# Patient Record
Sex: MALE | Race: WHITE | NOT HISPANIC OR LATINO | Employment: OTHER
[De-identification: names, ages, dates, MRNs, and addresses within clinical notes are randomized per-mention and may not be internally consistent; named-entity substitution may affect disease eponyms.]

---

## 2021-01-14 DIAGNOSIS — Z23 NEED FOR VACCINATION: ICD-10-CM

## 2021-10-14 ENCOUNTER — OFFICE VISIT (OUTPATIENT)
Dept: MEDICAL GROUP | Facility: OTHER | Age: 81
End: 2021-10-14
Payer: MEDICARE

## 2021-10-14 VITALS
RESPIRATION RATE: 18 BRPM | DIASTOLIC BLOOD PRESSURE: 70 MMHG | TEMPERATURE: 98.3 F | HEIGHT: 70 IN | OXYGEN SATURATION: 95 % | HEART RATE: 71 BPM | WEIGHT: 200 LBS | SYSTOLIC BLOOD PRESSURE: 130 MMHG | BODY MASS INDEX: 28.63 KG/M2

## 2021-10-14 DIAGNOSIS — I10 PRIMARY HYPERTENSION: ICD-10-CM

## 2021-10-14 DIAGNOSIS — R25.2 MUSCLE CRAMPS: ICD-10-CM

## 2021-10-14 DIAGNOSIS — E11.9 TYPE 2 DIABETES MELLITUS WITHOUT COMPLICATION, WITHOUT LONG-TERM CURRENT USE OF INSULIN (HCC): ICD-10-CM

## 2021-10-14 DIAGNOSIS — E03.8 OTHER SPECIFIED HYPOTHYROIDISM: ICD-10-CM

## 2021-10-14 DIAGNOSIS — C44.1192 BASAL CELL CARCINOMA (BCC) OF LEFT LOWER EYELID: ICD-10-CM

## 2021-10-14 DIAGNOSIS — I48.0 PAROXYSMAL ATRIAL FIBRILLATION (HCC): ICD-10-CM

## 2021-10-14 DIAGNOSIS — N18.4 CHRONIC RENAL IMPAIRMENT, STAGE 4 (SEVERE) (HCC): ICD-10-CM

## 2021-10-14 DIAGNOSIS — E78.2 MIXED HYPERLIPIDEMIA: ICD-10-CM

## 2021-10-14 PROBLEM — R79.89 DECREASED TESTOSTERONE LEVEL: Status: ACTIVE | Noted: 2020-10-14

## 2021-10-14 PROBLEM — Z00.00 ENCOUNTER FOR GENERAL ADULT MEDICAL EXAMINATION WITHOUT ABNORMAL FINDINGS: Status: ACTIVE | Noted: 2020-04-14

## 2021-10-14 PROBLEM — G47.33 OBSTRUCTIVE SLEEP APNEA, ADULT: Status: ACTIVE | Noted: 2020-01-10

## 2021-10-14 PROBLEM — E78.5 HYPERLIPIDEMIA: Status: ACTIVE | Noted: 2020-01-10

## 2021-10-14 PROBLEM — N18.9 CHRONIC RENAL INSUFFICIENCY: Status: ACTIVE | Noted: 2020-01-10

## 2021-10-14 LAB
HBA1C MFR BLD: 6.4 % (ref 0–5.6)
INT CON NEG: ABNORMAL
INT CON POS: ABNORMAL

## 2021-10-14 PROCEDURE — 83036 HEMOGLOBIN GLYCOSYLATED A1C: CPT | Performed by: FAMILY MEDICINE

## 2021-10-14 PROCEDURE — 99214 OFFICE O/P EST MOD 30 MIN: CPT | Performed by: FAMILY MEDICINE

## 2021-10-14 RX ORDER — PIOGLITAZONEHYDROCHLORIDE 15 MG/1
30 TABLET ORAL DAILY
COMMUNITY
End: 2022-01-14 | Stop reason: SDUPTHER

## 2021-10-14 RX ORDER — METOPROLOL TARTRATE 1 MG/ML
10 INJECTION, SOLUTION INTRAVENOUS
COMMUNITY
End: 2022-07-11

## 2021-10-14 ASSESSMENT — ENCOUNTER SYMPTOMS
COUGH: 0
FEVER: 0

## 2021-10-14 NOTE — PROGRESS NOTES
Subjective:   Maurisio Jones is a 81 y.o. male here for the evaluation and management of Follow-Up (f/u every 3 months)    Basal cell carcinoma as diagnosed by dermatology in the medial aspect of the left orbit and on the posterior aspect of his shoulder-he has been unable to obtain definitive excision by dermatology, he is allergic to lidocaine and apparently they do not have an alternative.  He reports recent dental procedure where an alternative local anesthetic was used without complication.  We discussed treatment options including plastic surgery evaluation  Problem   Muscle Cramps    Stable with electrolyte supplementation with noted improvement     Decreased Testosterone Level   Encounter for General Adult Medical Examination Without Abnormal Findings   Chronic Renal Insufficiency    Ongoing management by nephrology     Hyperlipidemia   Hypertension    Stable     Paroxysmal Atrial Fibrillation (Hcc)    Stable with current medications and reports no cardiac symptoms     Other Specified Hypothyroidism    Stable     Obstructive Sleep Apnea, Adult   Type 2 Diabetes Mellitus Without Complications (Hcc)    Stable with current medications     Urinary Frequency (Resolved)   Elevated Psa (Resolved)    no biopsies per pt         Review of Systems   Constitutional: Negative for fever.   Respiratory: Negative for cough.        Current Outpatient Medications   Medication Sig Dispense Refill   • SITagliptin (JANUVIA) 100 MG Tab Take 100 mg by mouth every day.     • pioglitazone (ACTOS) 15 MG Tab Take 30 mg by mouth every day.     • Metoprolol Tartrate (LOPRESSOR) 5 MG/5ML Solution Infuse 10 mg into a venous catheter. 1/2 pill po     • ONE TOUCH ULTRA TEST strip TEST THREE TIMES A DAY AS DIRECTED 100 Strip 3   • ARMOUR THYROID PO Take  by mouth. 3 1/2 grains      • anastrozole (ARIMIDEX) 1 MG TABS Take 0.5 mg by mouth Every Sunday and Wednesday.       No current facility-administered medications for this visit.  "    Allergies  Patient has no known allergies.    Past Medical History:   Diagnosis Date   • AAA (abdominal aortic aneurysm) (HCC)    • ADRENAL GLAND DYSFUNCTION    • Elevated PSA     no biopsies per pt   • Hyperlipidemia    • Hypogonadism     saw Dr. Bolton naturopath     Patient Active Problem List    Diagnosis Date Noted   • Muscle cramps 06/23/2021   • Decreased testosterone level 10/14/2020   • Encounter for general adult medical examination without abnormal findings 04/14/2020   • Chronic renal insufficiency 01/10/2020   • Hyperlipidemia 01/10/2020   • Hypertension 01/10/2020   • Paroxysmal atrial fibrillation (HCC) 01/10/2020   • Other specified hypothyroidism 01/10/2020   • Obstructive sleep apnea, adult 01/10/2020   • Type 2 diabetes mellitus without complications (HCC) 01/10/2020   • BPH (benign prostatic hypertrophy) 01/17/2012   • ED (erectile dysfunction) 01/17/2012   • ADRENAL GLAND DYSFUNCTION    • Hypogonadism    • ADRENAL GLAND DYSFUNCTION    • AAA (abdominal aortic aneurysm) (HCC)        Past Surgical History  History reviewed. No pertinent surgical history.     Objective:     Vitals:    10/14/21 1352   BP: 130/70   BP Location: Left arm   Patient Position: Sitting   BP Cuff Size: Adult   Pulse: 71   Resp: 18   Temp: 36.8 °C (98.3 °F)   TempSrc: Temporal   SpO2: 95%   Weight: 90.7 kg (200 lb)   Height: 1.778 m (5' 10\")     Body mass index is 28.7 kg/m².     Physical Exam  Constitutional:       Appearance: Normal appearance.   HENT:      Head: Normocephalic and atraumatic.   Eyes:      Extraocular Movements: Extraocular movements intact.      Conjunctiva/sclera: Conjunctivae normal.   Cardiovascular:      Rate and Rhythm: Normal rate. Rhythm irregular.      Heart sounds: Normal heart sounds.   Pulmonary:      Effort: Pulmonary effort is normal.      Breath sounds: Normal breath sounds.   Skin:     General: Skin is warm and dry.   Neurological:      General: No focal deficit present.      " Mental Status: He is alert and oriented to person, place, and time. Mental status is at baseline.   Psychiatric:         Mood and Affect: Mood normal.         Behavior: Behavior normal.     Skin exam over the medial left orbit demonstrates a small papular lesion approximately 2 mm in size correlating with recent skin biopsy demonstrating basal cell carcinoma    Assessment and Plan:   Maurisio Jones is a 81 y.o. male with a Follow-Up (f/u every 3 months)     The following was discussed with the patient today.    Problem List Items Addressed This Visit     Chronic renal insufficiency    Hyperlipidemia    Relevant Medications    Metoprolol Tartrate (LOPRESSOR) 5 MG/5ML Solution    Hypertension    Relevant Medications    Metoprolol Tartrate (LOPRESSOR) 5 MG/5ML Solution    Paroxysmal atrial fibrillation (HCC)    Relevant Medications    Metoprolol Tartrate (LOPRESSOR) 5 MG/5ML Solution    Other specified hypothyroidism    Muscle cramps    Type 2 diabetes mellitus without complications (HCC)    Relevant Medications    SITagliptin (JANUVIA) 100 MG Tab    pioglitazone (ACTOS) 15 MG Tab    Other Relevant Orders    POCT  A1C (Completed)    Comp Metabolic Panel    HEMOGLOBIN A1C    Lipid Profile      Other Visit Diagnoses     Basal cell carcinoma (BCC) of left lower eyelid        Relevant Orders    REFERRAL TO PLASTIC SURGERY        Laboratory studies reviewed and discussed, point-of-care hemoglobin A1c was 6.4, medications reviewed and updated with refills provided, referral to plastics provided given the inability to accomplish definitive treatment of basal cell carcinoma secondary to his adverse effect of lidocaine and dermatology's inability to find an acceptable alternative.  Close follow-up in 3 months with repeat labs.    Followup: Return in about 3 months (around 1/14/2022).    Ochoa Parr M.D.

## 2021-11-18 RX ORDER — BENZONATATE 100 MG/1
100 CAPSULE ORAL 3 TIMES DAILY PRN
COMMUNITY
End: 2021-11-18 | Stop reason: SDUPTHER

## 2021-11-18 RX ORDER — BENZONATATE 100 MG/1
100 CAPSULE ORAL 3 TIMES DAILY PRN
Qty: 60 CAPSULE | Refills: 2 | Status: SHIPPED
Start: 2021-11-18 | End: 2022-01-13

## 2022-01-13 ENCOUNTER — OFFICE VISIT (OUTPATIENT)
Dept: MEDICAL GROUP | Facility: OTHER | Age: 82
End: 2022-01-13
Payer: MEDICARE

## 2022-01-13 VITALS
HEIGHT: 70 IN | BODY MASS INDEX: 29.35 KG/M2 | OXYGEN SATURATION: 94 % | HEART RATE: 97 BPM | SYSTOLIC BLOOD PRESSURE: 115 MMHG | RESPIRATION RATE: 16 BRPM | DIASTOLIC BLOOD PRESSURE: 70 MMHG | TEMPERATURE: 98.2 F | WEIGHT: 205 LBS

## 2022-01-13 DIAGNOSIS — N18.4 CHRONIC RENAL IMPAIRMENT, STAGE 4 (SEVERE) (HCC): ICD-10-CM

## 2022-01-13 DIAGNOSIS — I10 PRIMARY HYPERTENSION: ICD-10-CM

## 2022-01-13 DIAGNOSIS — E11.9 TYPE 2 DIABETES MELLITUS WITHOUT COMPLICATION, WITHOUT LONG-TERM CURRENT USE OF INSULIN (HCC): ICD-10-CM

## 2022-01-13 DIAGNOSIS — E03.8 OTHER SPECIFIED HYPOTHYROIDISM: ICD-10-CM

## 2022-01-13 DIAGNOSIS — T16.2XXA FOREIGN BODY OF LEFT EAR, INITIAL ENCOUNTER: ICD-10-CM

## 2022-01-13 DIAGNOSIS — E78.2 MIXED HYPERLIPIDEMIA: ICD-10-CM

## 2022-01-13 DIAGNOSIS — I48.0 PAROXYSMAL ATRIAL FIBRILLATION (HCC): ICD-10-CM

## 2022-01-13 LAB
HBA1C MFR BLD: 6.1 % (ref 0–5.6)
INT CON NEG: ABNORMAL
INT CON POS: ABNORMAL

## 2022-01-13 PROCEDURE — 83036 HEMOGLOBIN GLYCOSYLATED A1C: CPT | Performed by: FAMILY MEDICINE

## 2022-01-13 PROCEDURE — 99214 OFFICE O/P EST MOD 30 MIN: CPT | Mod: 25 | Performed by: FAMILY MEDICINE

## 2022-01-13 RX ORDER — LINAGLIPTIN 5 MG/1
5 TABLET, FILM COATED ORAL DAILY
Qty: 90 TABLET | Refills: 1 | Status: SHIPPED | OUTPATIENT
Start: 2022-01-13

## 2022-01-13 RX ORDER — PIOGLITAZONEHYDROCHLORIDE 30 MG/1
TABLET ORAL
COMMUNITY
Start: 2021-11-03 | End: 2022-01-13

## 2022-01-13 RX ORDER — LISINOPRIL 10 MG/1
TABLET ORAL
COMMUNITY
Start: 2021-11-03 | End: 2022-01-14 | Stop reason: SDUPTHER

## 2022-01-13 RX ORDER — FLUTICASONE PROPIONATE 50 MCG
SPRAY, SUSPENSION (ML) NASAL
COMMUNITY
End: 2022-01-13

## 2022-01-13 RX ORDER — VISMODEGIB 150 MG/1
CAPSULE ORAL
COMMUNITY
Start: 2022-01-05

## 2022-01-13 RX ORDER — LEVOTHYROXINE SODIUM 137 UG/1
TABLET ORAL
COMMUNITY
Start: 2021-11-14 | End: 2022-02-23 | Stop reason: SDUPTHER

## 2022-01-13 RX ORDER — TESTOSTERONE CYPIONATE 200 MG/ML
INJECTION, SOLUTION INTRAMUSCULAR
COMMUNITY
End: 2022-01-13

## 2022-01-13 RX ORDER — METOPROLOL TARTRATE AND HYDROCHLOROTHIAZIDE 50; 25 MG/1; MG/1
TABLET ORAL
COMMUNITY
End: 2022-01-13

## 2022-01-13 NOTE — PROGRESS NOTES
Subjective:   Maurisio Jones is a 81 y.o. male here for the evaluation and management of Follow-Up (3 month check )    Chronic renal insufficiency-reviewed and discussed recent laboratory studies with largely stable findings but elevated potassium level, we discussed laboratory studies with the patient and his wife and I contacted his nephrologist and left a detailed message.    Hyperlipidemia- stable    Hypertension-stable    Hypothyroidism-stable    Paroxysmal atrial fibrillation-stable and no complaints    Diabetes-stable, reviewed and discussed labs with good control of blood sugar    He reports part of his hearing aid got stuck in his left ear  Problem   Chronic Renal Insufficiency    Ongoing management by nephrology, Dr. Soliz         Review of Systems   Constitutional: Negative.    Cardiovascular: Negative.        Current Outpatient Medications   Medication Sig Dispense Refill   • ERIVEDGE 150 MG Cap      • lisinopril (PRINIVIL) 10 MG Tab      • levothyroxine (SYNTHROID) 137 MCG Tab      • glucose blood strip OneTouch Ultra Blue Test Strip     • SITagliptin (JANUVIA) 100 MG Tab Take 100 mg by mouth every day.     • pioglitazone (ACTOS) 15 MG Tab Take 30 mg by mouth every day.     • Metoprolol Tartrate (LOPRESSOR) 5 MG/5ML Solution Infuse 10 mg into a venous catheter. 1/2 pill po     • ONE TOUCH ULTRA TEST strip TEST THREE TIMES A DAY AS DIRECTED 100 Strip 3   • benzonatate (TESSALON) 100 MG Cap Take 1 Capsule by mouth 3 times a day as needed. 60 Capsule 2     No current facility-administered medications for this visit.     Allergies  Patient has no known allergies.    Past Medical History:   Diagnosis Date   • AAA (abdominal aortic aneurysm) (HCC)    • ADRENAL GLAND DYSFUNCTION    • Elevated PSA     no biopsies per pt   • Hyperlipidemia    • Hypogonadism     saw Dr. Bolton naturopath     Patient Active Problem List    Diagnosis Date Noted   • Muscle cramps 06/23/2021   • Decreased testosterone  "level 10/14/2020   • Encounter for general adult medical examination without abnormal findings 04/14/2020   • Chronic renal insufficiency 01/10/2020   • Hyperlipidemia 01/10/2020   • Hypertension 01/10/2020   • Paroxysmal atrial fibrillation (HCC) 01/10/2020   • Other specified hypothyroidism 01/10/2020   • Obstructive sleep apnea, adult 01/10/2020   • Type 2 diabetes mellitus without complications (HCC) 01/10/2020   • BPH (benign prostatic hypertrophy) 01/17/2012   • ED (erectile dysfunction) 01/17/2012   • ADRENAL GLAND DYSFUNCTION    • Hypogonadism    • ADRENAL GLAND DYSFUNCTION    • AAA (abdominal aortic aneurysm) (HCC)        Past Surgical History  History reviewed. No pertinent surgical history.     Objective:     Vitals:    01/13/22 1324   BP: 115/70   BP Location: Left arm   Patient Position: Sitting   BP Cuff Size: Adult   Pulse: 97   Resp: 16   Temp: 36.8 °C (98.2 °F)   TempSrc: Temporal   SpO2: 94%   Weight: 93 kg (205 lb)   Height: 1.778 m (5' 10\")     Body mass index is 29.41 kg/m².     Physical Exam  Constitutional:       Appearance: Normal appearance.   HENT:      Head: Normocephalic.   Eyes:      Extraocular Movements: Extraocular movements intact.      Conjunctiva/sclera: Conjunctivae normal.   Cardiovascular:      Rate and Rhythm: Normal rate and regular rhythm.      Heart sounds: Normal heart sounds.   Pulmonary:      Effort: Pulmonary effort is normal.      Breath sounds: Normal breath sounds.   Skin:     General: Skin is warm and dry.   Neurological:      Mental Status: He is alert and oriented to person, place, and time. Mental status is at baseline.   Psychiatric:         Mood and Affect: Mood normal.         Behavior: Behavior normal.     Left ear examination demonstrated  Demonstrated a silicone part of his hearing aid was lodged in his left external auditory canal, after removal the exam was benign  Assessment and Plan:   Maurisio Jones is a 81 y.o. male with a Follow-Up (3 month check " )     The following was discussed with the patient today.    Problem List Items Addressed This Visit     Chronic renal insufficiency    Hyperlipidemia    Relevant Medications    lisinopril (PRINIVIL) 10 MG Tab    Hypertension    Relevant Medications    lisinopril (PRINIVIL) 10 MG Tab    Other specified hypothyroidism    Relevant Medications    levothyroxine (SYNTHROID) 137 MCG Tab    Type 2 diabetes mellitus without complications (HCC)        Reviewed and refills provided, reviewed and discussed recent laboratory studies with subsequent phone call to nephrology and a detailed message left with the medical assistant regarding elevation in potassium level, removal of foreign body from his left ear, recommended close follow-up in 3 months and follow-up as planned with specialists  Followup: No follow-ups on file.    Ochoa Parr M.D.    Please note that this dictation was created using voice recognition software. I have made every reasonable attempt to correct obvious errors, but I expect that there are errors of grammar and possibly content that I did not discover before finalizing the note.

## 2022-01-14 RX ORDER — PIOGLITAZONEHYDROCHLORIDE 15 MG/1
15 TABLET ORAL DAILY
Qty: 90 TABLET | Refills: 3 | Status: SHIPPED
Start: 2022-01-14 | End: 2022-01-20

## 2022-01-14 RX ORDER — LISINOPRIL 10 MG/1
10 TABLET ORAL DAILY
Qty: 90 TABLET | Refills: 3 | Status: SHIPPED | OUTPATIENT
Start: 2022-01-14 | End: 2022-07-11 | Stop reason: SDUPTHER

## 2022-01-14 ASSESSMENT — ENCOUNTER SYMPTOMS
CARDIOVASCULAR NEGATIVE: 1
CONSTITUTIONAL NEGATIVE: 1

## 2022-01-14 NOTE — PROCEDURES
General Procedure    Date/Time: 1/14/2022 8:47 AM  Performed by: Ochoa Parr M.D.  Authorized by: Ochoa Parr M.D.   Consent: Verbal consent obtained.  Consent given by: patient  Patient identity confirmed: verbally with patient  Local anesthesia used: no    Anesthesia:  Local anesthesia used: no    Sedation:  Patient sedated: no    Comments: Maurisio reported part of his hearing aid became lodged in his left ear and requested removal.  On examination there was silicone part associated with his hearing aids that it become lodged deep in his left external auditory canal with associated cerumen.  Patient requested removal and I obtained forceps and a probe.  The probe was used to mobilize the silicone and subsequently I used forceps to grasp the silicone and remove it.  Removal was complicated by adherence to the external auditory canal.  Ultimately we were successful with patient's and repeated attempts.  There were no complications.        Foreign body removed from the left ear

## 2022-01-18 ENCOUNTER — TELEPHONE (OUTPATIENT)
Dept: MEDICAL GROUP | Facility: OTHER | Age: 82
End: 2022-01-18

## 2022-01-18 DIAGNOSIS — E11.9 TYPE 2 DIABETES MELLITUS WITHOUT COMPLICATION, WITHOUT LONG-TERM CURRENT USE OF INSULIN (HCC): ICD-10-CM

## 2022-01-18 NOTE — TELEPHONE ENCOUNTER
PTS WIFE CALLED AND SAID THAT THE PIOGLITAZONE WAS 30 MG BUT WHEN THEY GOT THE REFILL IT WAS ONLY 15 MG. SO THEY NEED TO KNOW IF HE IS TO TAKE 15MG OR 30MG. [phone]

## 2022-01-19 NOTE — TELEPHONE ENCOUNTER
Dr. Parr not sure if we decreased his Actos, I did show that he should be on 30 mg, not sure if on his last visit we made a change. Please advise.

## 2022-01-20 RX ORDER — PIOGLITAZONEHYDROCHLORIDE 30 MG/1
30 TABLET ORAL DAILY
Qty: 90 TABLET | Refills: 3 | Status: SHIPPED | OUTPATIENT
Start: 2022-01-20

## 2022-02-23 RX ORDER — LEVOTHYROXINE SODIUM 137 UG/1
137 TABLET ORAL
Qty: 100 TABLET | Refills: 3 | Status: SHIPPED | OUTPATIENT
Start: 2022-02-23 | End: 2022-07-11 | Stop reason: SDUPTHER

## 2022-04-12 ENCOUNTER — OFFICE VISIT (OUTPATIENT)
Dept: MEDICAL GROUP | Facility: OTHER | Age: 82
End: 2022-04-12
Payer: MEDICARE

## 2022-04-12 VITALS
DIASTOLIC BLOOD PRESSURE: 63 MMHG | BODY MASS INDEX: 29.41 KG/M2 | HEART RATE: 71 BPM | SYSTOLIC BLOOD PRESSURE: 120 MMHG | WEIGHT: 205 LBS | RESPIRATION RATE: 14 BRPM | OXYGEN SATURATION: 91 % | TEMPERATURE: 96.5 F

## 2022-04-12 DIAGNOSIS — E11.9 TYPE 2 DIABETES MELLITUS WITHOUT COMPLICATION, WITHOUT LONG-TERM CURRENT USE OF INSULIN (HCC): ICD-10-CM

## 2022-04-12 DIAGNOSIS — I48.0 PAROXYSMAL ATRIAL FIBRILLATION (HCC): ICD-10-CM

## 2022-04-12 DIAGNOSIS — Z00.00 ENCOUNTER FOR GENERAL ADULT MEDICAL EXAMINATION WITHOUT ABNORMAL FINDINGS: ICD-10-CM

## 2022-04-12 DIAGNOSIS — E03.8 OTHER SPECIFIED HYPOTHYROIDISM: ICD-10-CM

## 2022-04-12 DIAGNOSIS — I10 PRIMARY HYPERTENSION: ICD-10-CM

## 2022-04-12 DIAGNOSIS — N18.4 CHRONIC RENAL IMPAIRMENT, STAGE 4 (SEVERE) (HCC): ICD-10-CM

## 2022-04-12 PROBLEM — C44.1192: Status: ACTIVE | Noted: 2022-02-25

## 2022-04-12 PROCEDURE — G0439 PPPS, SUBSEQ VISIT: HCPCS | Performed by: FAMILY MEDICINE

## 2022-04-12 RX ORDER — LEVOTHYROXINE SODIUM 137 UG/1
137 TABLET ORAL
COMMUNITY
End: 2022-07-11

## 2022-04-12 RX ORDER — AMLODIPINE BESYLATE 5 MG/1
TABLET ORAL
COMMUNITY
Start: 2022-03-27 | End: 2022-04-12 | Stop reason: SDUPTHER

## 2022-04-12 RX ORDER — AMLODIPINE BESYLATE 5 MG/1
5 TABLET ORAL DAILY
Qty: 90 TABLET | Refills: 3 | Status: SHIPPED | OUTPATIENT
Start: 2022-04-12

## 2022-04-12 RX ORDER — EMPAGLIFLOZIN 10 MG/1
10 TABLET, FILM COATED ORAL DAILY
Qty: 30 TABLET | Refills: 2 | Status: SHIPPED
Start: 2022-04-12 | End: 2022-07-12 | Stop reason: SDUPTHER

## 2022-04-12 RX ORDER — ASPIRIN 81 MG/1
81 TABLET, CHEWABLE ORAL DAILY
COMMUNITY

## 2022-04-12 ASSESSMENT — ENCOUNTER SYMPTOMS
GASTROINTESTINAL NEGATIVE: 1
RESPIRATORY NEGATIVE: 1
CARDIOVASCULAR NEGATIVE: 1
CONSTITUTIONAL NEGATIVE: 1

## 2022-04-12 ASSESSMENT — PATIENT HEALTH QUESTIONNAIRE - PHQ9: CLINICAL INTERPRETATION OF PHQ2 SCORE: 0

## 2022-04-12 NOTE — PROGRESS NOTES
Subjective:   Maurisio Jones is a 82 y.o. male here for the evaluation and management of Follow-Up (3 month )    Diabetes-stable    Chronic renal insufficiency-reviewed recent laboratory studies and patient reports ongoing management by nephrology, he thinks nephrology may have recommended discontinuation of lisinopril in favor of amlodipine    Hypertension-stable    Paroxysmal atrial fibrillation-patient monitors his heart rate with his watch and reports very rare occasions when he gets into atrial fibrillation, he reports compliance with use of aspirin    He reports his nephrologist wanted him to consider a switch to Jardiance  No problems updated.    Review of Systems   Constitutional: Negative.    Respiratory: Negative.    Cardiovascular: Negative.    Gastrointestinal: Negative.        Current Outpatient Medications   Medication Sig Dispense Refill   • levothyroxine (SYNTHROID) 137 MCG Tab Take 137 mcg by mouth.     • aspirin (ASA) 81 MG Chew Tab chewable tablet Chew 81 mg every day.     • amLODIPine (NORVASC) 5 MG Tab      • levothyroxine (SYNTHROID) 137 MCG Tab Take 1 Tablet by mouth every morning on an empty stomach. 100 Tablet 3   • pioglitazone (ACTOS) 30 MG Tab Take 1 Tablet by mouth every day. 90 Tablet 3   • lisinopril (PRINIVIL) 10 MG Tab Take 1 Tablet by mouth every day. 90 Tablet 3   • ERIVEDGE 150 MG Cap      • glucose blood strip OneTouch Ultra Blue Test Strip     • linagliptin (TRADJENTA) 5 MG Tab tablet Take 1 Tablet by mouth every day. 90 Tablet 1   • Metoprolol Tartrate (LOPRESSOR) 5 MG/5ML Solution Infuse 10 mg into a venous catheter. 1/2 pill po     • ONE TOUCH ULTRA TEST strip TEST THREE TIMES A DAY AS DIRECTED 100 Strip 3     No current facility-administered medications for this visit.     Allergies  Patient has no known allergies.    Past Medical History:   Diagnosis Date   • AAA (abdominal aortic aneurysm) (HCC)    • ADRENAL GLAND DYSFUNCTION    • Elevated PSA     no biopsies per pt    • Hyperlipidemia    • Hypogonadism     saw Dr. Che wharton     Patient Active Problem List    Diagnosis Date Noted   • Muscle cramps 06/23/2021   • Decreased testosterone level 10/14/2020   • Encounter for general adult medical examination without abnormal findings 04/14/2020   • Chronic renal insufficiency 01/10/2020   • Hyperlipidemia 01/10/2020   • Hypertension 01/10/2020   • Paroxysmal atrial fibrillation (HCC) 01/10/2020   • Other specified hypothyroidism 01/10/2020   • Obstructive sleep apnea, adult 01/10/2020   • Type 2 diabetes mellitus without complications (HCC) 01/10/2020   • BPH (benign prostatic hypertrophy) 01/17/2012   • ED (erectile dysfunction) 01/17/2012   • ADRENAL GLAND DYSFUNCTION    • AAA (abdominal aortic aneurysm) (HCC)        Past Surgical History  No past surgical history on file.     Objective:     Vitals:    04/12/22 1454   BP: 120/63   BP Location: Right arm   Patient Position: Sitting   BP Cuff Size: Adult   Pulse: 71   Resp: 14   Temp: 35.8 °C (96.5 °F)   TempSrc: Temporal   SpO2: 91%   Weight: 93 kg (205 lb)     Body mass index is 29.41 kg/m².     Physical Exam    Assessment and Plan:   Maurisio Jones is a 82 y.o. male with a Follow-Up (3 month )     The following was discussed with the patient today.    Problem List Items Addressed This Visit    None       Chief Complaint   Patient presents with   • Follow-Up     3 month        HPI:  Maurisio Jones is a 82 y.o. here for Medicare Annual Wellness Visit     Patient Active Problem List    Diagnosis Date Noted   • Basal cell carcinoma (BCC) of skin of left lower eyelid including canthus 02/25/2022   • Muscle cramps 06/23/2021   • Decreased testosterone level 10/14/2020   • Encounter for general adult medical examination without abnormal findings 04/14/2020   • Chronic renal insufficiency 01/10/2020   • Hyperlipidemia 01/10/2020   • Hypertension 01/10/2020   • Paroxysmal atrial fibrillation (HCC) 01/10/2020   •  Other specified hypothyroidism 01/10/2020   • Obstructive sleep apnea, adult 01/10/2020   • Type 2 diabetes mellitus without complications (HCC) 01/10/2020   • BPH (benign prostatic hypertrophy) 01/17/2012   • ED (erectile dysfunction) 01/17/2012   • ADRENAL GLAND DYSFUNCTION    • AAA (abdominal aortic aneurysm) ([facility])        Current Outpatient Medications   Medication Sig Dispense Refill   • levothyroxine (SYNTHROID) 137 MCG Tab Take 137 mcg by mouth.     • aspirin (ASA) 81 MG Chew Tab chewable tablet Chew 81 mg every day.     • amLODIPine (NORVASC) 5 MG Tab Take 1 Tablet by mouth every day. 90 Tablet 3   • Empagliflozin (JARDIANCE) 10 MG Tab Take 10 Tablets by mouth every day. 30 Tablet 2   • levothyroxine (SYNTHROID) 137 MCG Tab Take 1 Tablet by mouth every morning on an empty stomach. 100 Tablet 3   • pioglitazone (ACTOS) 30 MG Tab Take 1 Tablet by mouth every day. 90 Tablet 3   • lisinopril (PRINIVIL) 10 MG Tab Take 1 Tablet by mouth every day. 90 Tablet 3   • ERIVEDGE 150 MG Cap      • glucose blood strip OneTouch Ultra Blue Test Strip     • linagliptin (TRADJENTA) 5 MG Tab tablet Take 1 Tablet by mouth every day. 90 Tablet 1   • Metoprolol Tartrate (LOPRESSOR) 5 MG/5ML Solution Infuse 10 mg into a venous catheter. 1/2 pill po     • ONE TOUCH ULTRA TEST strip TEST THREE TIMES A DAY AS DIRECTED 100 Strip 3     No current facility-administered medications for this visit.          Current supplements as per medication list.     Allergies: Patient has no known allergies.    Current social contact/activities: family     He  reports that he quit smoking about 22 years ago. His smoking use included cigarettes. He has a 90.00 pack-year smoking history. He has never used smokeless tobacco. He reports that he does not drink alcohol and does not use drugs.  Counseling given: Not Answered      DPA/Advanced Directive:  Patient has Living Will, but it is not on file. Instructed to bring in a copy to scan into their  chart.    ROS:    Gait: Uses a cane  Ostomy: No  Other tubes: No  Amputations: No  Chronic oxygen use: No  Last eye exam: overdue  Wears hearing aids: Yes   : Reports urinary leakage during the last 6 months that has somewhat interfered with their daily activities or sleep.    Screening: limited    Depression Screening  Little interest or pleasure in doing things?     Feeling down, depressed , or hopeless?    Trouble falling or staying asleep, or sleeping too much?     Feeling tired or having little energy?     Poor appetite or overeating?     Feeling bad about yourself - or that you are a failure or have let yourself or your family down?    Trouble concentrating on things, such as reading the newspaper or watching television?    Moving or speaking so slowly that other people could have noticed.  Or the opposite - being so fidgety or restless that you have been moving around a lot more than usual?     Thoughts that you would be better off dead, or of hurting yourself?     Patient Health Questionnaire Score:      If depressive symptoms identified deferred to follow up visit unless specifically addressed in assessment and plan.    Interpretation of PHQ-9 Total Score   Score Severity   1-4 No Depression   5-9 Mild Depression   10-14 Moderate Depression   15-19 Moderately Severe Depression   20-27 Severe Depression    Screening for Cognitive Impairment  Three Minute Recall (daughter, heaven, mountain)  /3    Temo clock face with all 12 numbers and set the hands to show 10 past 11.       Cognitive concerns identified deferred for follow up unless specifically addressed in assessment and plan.    Fall Risk Assessment  Has the patient had two or more falls in the last year or any fall with injury in the last year?   yes, twiced    Safety Assessment  Throw rugs on floor.   no  Handrails on all stairs.   yes  Good lighting in all hallways.   yes  Difficulty hearing.   yes  Patient counseled about all safety risks that were  identified.    Functional Assessment ADLs  Are there any barriers preventing you from cooking for yourself or meeting nutritional needs?   .    Are there any barriers preventing you from driving safely or obtaining transportation?   .    Are there any barriers preventing you from using a telephone or calling for help?   .    Are there any barriers preventing you from shopping?   .    Are there any barriers preventing you from taking care of your own finances?   .    Are there any barriers preventing you from managing your medications?   .    Are there any barriers preventing you from showering, bathing or dressing yourself?  .    Are you currently engaging in any exercise or physical activity?   .     What is your perception of your health?  .    Health Maintenance Summary          Overdue - Annual Wellness Visit (Once) Overdue - never done    No completion history exists for this topic.          Overdue - URINE ACR / MICROALBUMIN (Yearly) Overdue - never done    No completion history exists for this topic.          Overdue - COVID-19 Vaccine (1) Overdue - never done    No completion history exists for this topic.          Overdue - IMM PNEUMOCOCCAL VACCINE: 65+ Years (1 - PCV) Overdue - never done    No completion history exists for this topic.          Overdue - RETINAL SCREENING (Yearly) Overdue - never done    No completion history exists for this topic.          Overdue - IMM DTaP/Tdap/Td Vaccine (1 - Tdap) Overdue - never done    No completion history exists for this topic.          Overdue - IMM ZOSTER VACCINES (1 of 2) Overdue - never done    No completion history exists for this topic.          Overdue - COLORECTAL CANCER SCREENING (COLONOSCOPY - Every 10 Years) Overdue - never done    No completion history exists for this topic.          A1C SCREENING (Every 6 Months) Next due on 7/13/2022 01/13/2022  POCT  A1C    10/14/2021  POCT  A1C          IMM INFLUENZA (Season Ended) Next due on 9/1/2022     "10/02/2020  Imm Admin: Influenza Vaccine Quad Recombinant    10/14/2019  Imm Admin: Influenza Vaccine Quad Recombinant    10/12/2018  Imm Admin: Influenza Vaccine Adult HD          FASTING LIPID PROFILE (Yearly) Next due on 1/10/2023    01/10/2022  LIPID PANEL          SERUM CREATININE (Yearly) Next due on 1/10/2023    01/10/2022  COMP METABOLIC PANEL          DIABETES MONOFILAMENT / LE EXAM (Yearly) Next due on 2022  Diabetic Monofilament Lower Extremity Exam          IMM HEP B VACCINE (Series Information) Aged Out    No completion history exists for this topic.          IMM MENINGOCOCCAL VACCINE (MCV4) (Series Information) Aged Out    No completion history exists for this topic.                Patient Care Team:  Daija Mendoza M.D. (Inactive) as PCP - General      Social History     Tobacco Use   • Smoking status: Former Smoker     Packs/day: 2.00     Years: 45.00     Pack years: 90.00     Types: Cigarettes     Quit date: 2000     Years since quittin.2   • Smokeless tobacco: Never Used   Vaping Use   • Vaping Use: Never used   Substance Use Topics   • Alcohol use: No   • Drug use: No     Family History   Problem Relation Age of Onset   • Other Mother         dad smothered her   • Other Father         diaphragmatic hernia and pt wanted\"natural treatment\" and they convinced him to operate and he  on the table   • Diabetes Brother      He  has a past medical history of AAA (abdominal aortic aneurysm) (HCC), ADRENAL GLAND DYSFUNCTION, Elevated PSA, Hyperlipidemia, and Hypogonadism.   History reviewed. No pertinent surgical history.    Exam:   /63 (BP Location: Right arm, Patient Position: Sitting, BP Cuff Size: Adult)   Pulse 71   Temp 35.8 °C (96.5 °F) (Temporal)   Resp 14   Wt 93 kg (205 lb)   SpO2 91%  Body mass index is 29.41 kg/m².    Hearing good.    Dentition fair  Alert, oriented in no acute distress.  Eye contact is good, speech goal directed, affect " calm    Assessment and Plan. The following treatment and monitoring plan is recommended:    1. Chronic renal impairment, stage 4 (severe) (HCC)    2. Paroxysmal atrial fibrillation (HCC)    3. Other specified hypothyroidism    4. Type 2 diabetes mellitus without complication, without long-term current use of insulin (HCC)  - Diabetic Monofilament Lower Extremity Exam  - Empagliflozin (JARDIANCE) 10 MG Tab; Take 10 Tablets by mouth every day.  Dispense: 30 Tablet; Refill: 2    5. Primary hypertension  - amLODIPine (NORVASC) 5 MG Tab; Take 1 Tablet by mouth every day.  Dispense: 90 Tablet; Refill: 3    Other orders  - levothyroxine (SYNTHROID) 137 MCG Tab; Take 137 mcg by mouth.  - aspirin (ASA) 81 MG Chew Tab chewable tablet; Chew 81 mg every day.      Services suggested: No services needed at this time  Health Care Screening: Age-appropriate preventive services recommended by USPTF and ACIP covered by Medicare were discussed today. Services ordered if indicated and agreed upon by the patient.  Referrals offered: Community-based lifestyle interventions to reduce health risks and promote self-management and wellness, fall prevention, nutrition, physical activity, tobacco-use cessation, weight loss, and mental health services as per orders if indicated.    Discussion today about general wellness and lifestyle habits:    · Prevent falls and reduce trip hazards; Cautioned about securing or removing rugs.  · Have a working fire alarm and carbon monoxide detector;   · Engage in regular physical activity and social activities       Reviewed and discussed recent laboratory studies, discussed alternative treatment options for diabetes including the introduction of Jardiance and provided him prescription to check with her pharmacy to see if this is an affordable option for them, long discussion about ambulation and patient reports using a cane when out and also has a wheeled walker which he uses many times.  He is fallen twice  in the last 6 months both have been described as minor, he is managing urinary incontinence with depends, they report they have a will in place at home, anticipatory guidance provided regarding home safety, follow-up in 3 months with repeat labs  Follow-up: No follow-ups on file.    Followup: No follow-ups on file.    Ochoa Parr M.D.    Please note that this dictation was created using voice recognition software. I have made every reasonable attempt to correct obvious errors, but I expect that there are errors of grammar and possibly content that I did not discover before finalizing the note.

## 2022-07-11 ENCOUNTER — OFFICE VISIT (OUTPATIENT)
Dept: MEDICAL GROUP | Facility: OTHER | Age: 82
End: 2022-07-11
Payer: MEDICARE

## 2022-07-11 VITALS
OXYGEN SATURATION: 94 % | WEIGHT: 205 LBS | SYSTOLIC BLOOD PRESSURE: 120 MMHG | HEART RATE: 59 BPM | TEMPERATURE: 97.5 F | HEIGHT: 68 IN | RESPIRATION RATE: 14 BRPM | BODY MASS INDEX: 31.07 KG/M2 | DIASTOLIC BLOOD PRESSURE: 74 MMHG

## 2022-07-11 DIAGNOSIS — N18.4 CHRONIC RENAL IMPAIRMENT, STAGE 4 (SEVERE) (HCC): ICD-10-CM

## 2022-07-11 DIAGNOSIS — E11.9 TYPE 2 DIABETES MELLITUS WITHOUT COMPLICATION, WITHOUT LONG-TERM CURRENT USE OF INSULIN (HCC): ICD-10-CM

## 2022-07-11 DIAGNOSIS — C44.1192 BASAL CELL CARCINOMA (BCC) OF SKIN OF LEFT LOWER EYELID INCLUDING CANTHUS: ICD-10-CM

## 2022-07-11 DIAGNOSIS — E03.8 OTHER SPECIFIED HYPOTHYROIDISM: ICD-10-CM

## 2022-07-11 DIAGNOSIS — I10 PRIMARY HYPERTENSION: ICD-10-CM

## 2022-07-11 PROBLEM — R25.2 MUSCLE CRAMPS: Status: RESOLVED | Noted: 2021-06-23 | Resolved: 2022-07-11

## 2022-07-11 PROCEDURE — 99214 OFFICE O/P EST MOD 30 MIN: CPT | Performed by: FAMILY MEDICINE

## 2022-07-11 RX ORDER — LEVOTHYROXINE SODIUM 137 UG/1
137 TABLET ORAL
Qty: 100 TABLET | Refills: 3 | Status: SHIPPED | OUTPATIENT
Start: 2022-07-11

## 2022-07-11 RX ORDER — LISINOPRIL 10 MG/1
10 TABLET ORAL DAILY
Qty: 90 TABLET | Refills: 3 | Status: SHIPPED | OUTPATIENT
Start: 2022-07-11

## 2022-07-11 ASSESSMENT — ENCOUNTER SYMPTOMS
CONSTITUTIONAL NEGATIVE: 1
CARDIOVASCULAR NEGATIVE: 1

## 2022-07-11 NOTE — PROGRESS NOTES
Subjective:   Maurisio Jones is a 82 y.o. male here for the evaluation and management of Follow-Up (3 MONTH FUP )    Basal cell carcinoma of the left eyelid-significant improvement after treatment, patient is glad he did not elect surgical management    Renal insufficiency-reviewed and discussed recent labs with stable findings    Hypertension-stable    Hypothyroidism-stable    Diabetes- stable, reviewed and discussed recent laboratory studies,  No problems updated.    Review of Systems   Constitutional: Negative.    Cardiovascular: Negative.        Current Outpatient Medications   Medication Sig Dispense Refill   • levothyroxine (SYNTHROID) 137 MCG Tab Take 137 mcg by mouth.     • aspirin (ASA) 81 MG Chew Tab chewable tablet Chew 81 mg every day.     • amLODIPine (NORVASC) 5 MG Tab Take 1 Tablet by mouth every day. 90 Tablet 3   • Empagliflozin (JARDIANCE) 10 MG Tab Take 10 Tablets by mouth every day. 30 Tablet 2   • levothyroxine (SYNTHROID) 137 MCG Tab Take 1 Tablet by mouth every morning on an empty stomach. 100 Tablet 3   • lisinopril (PRINIVIL) 10 MG Tab Take 1 Tablet by mouth every day. 90 Tablet 3   • ERIVEDGE 150 MG Cap      • glucose blood strip OneTouch Ultra Blue Test Strip     • linagliptin (TRADJENTA) 5 MG Tab tablet Take 1 Tablet by mouth every day. 90 Tablet 1   • Metoprolol Tartrate (LOPRESSOR) 5 MG/5ML Solution Infuse 10 mg into a venous catheter. 1/2 pill po     • ONE TOUCH ULTRA TEST strip TEST THREE TIMES A DAY AS DIRECTED 100 Strip 3   • pioglitazone (ACTOS) 30 MG Tab Take 1 Tablet by mouth every day. 90 Tablet 3     No current facility-administered medications for this visit.     Allergies  Patient has no known allergies.    Past Medical History:   Diagnosis Date   • AAA (abdominal aortic aneurysm) (HCC)    • ADRENAL GLAND DYSFUNCTION    • Elevated PSA     no biopsies per pt   • Hyperlipidemia    • Hypogonadism     saw Dr. Bolton naturopath     Patient Active Problem List     "Diagnosis Date Noted   • Basal cell carcinoma (BCC) of skin of left lower eyelid including canthus 02/25/2022   • Muscle cramps 06/23/2021   • Decreased testosterone level 10/14/2020   • Encounter for general adult medical examination without abnormal findings 04/14/2020   • Chronic renal insufficiency 01/10/2020   • Hyperlipidemia 01/10/2020   • Hypertension 01/10/2020   • Paroxysmal atrial fibrillation (HCC) 01/10/2020   • Other specified hypothyroidism 01/10/2020   • Obstructive sleep apnea, adult 01/10/2020   • Type 2 diabetes mellitus without complications (HCC) 01/10/2020   • BPH (benign prostatic hypertrophy) 01/17/2012   • ED (erectile dysfunction) 01/17/2012   • ADRENAL GLAND DYSFUNCTION        Past Surgical History  No past surgical history on file.     Objective:     Vitals:    07/11/22 1427   BP: 120/74   BP Location: Left arm   Patient Position: Sitting   BP Cuff Size: Adult   Pulse: (!) 59   Resp: 14   Temp: 36.4 °C (97.5 °F)   TempSrc: Temporal   SpO2: 94%   Weight: 93 kg (205 lb)   Height: 1.727 m (5' 8\")     Body mass index is 31.17 kg/m².     Physical Exam  Constitutional:       Appearance: Normal appearance.   HENT:      Head: Normocephalic.   Eyes:      Extraocular Movements: Extraocular movements intact.      Conjunctiva/sclera: Conjunctivae normal.   Cardiovascular:      Rate and Rhythm: Normal rate and regular rhythm.      Heart sounds: Normal heart sounds.   Pulmonary:      Effort: Pulmonary effort is normal.      Breath sounds: Normal breath sounds.   Skin:     General: Skin is warm and dry.   Neurological:      Mental Status: He is alert and oriented to person, place, and time. Mental status is at baseline.   Psychiatric:         Mood and Affect: Mood normal.         Behavior: Behavior normal.         Assessment and Plan:   Maurisio Jones is a 82 y.o. male with a Follow-Up (3 MONTH FUP )     The following was discussed with the patient today.    Problem List Items Addressed This Visit  "   None       Medications reviewed, patient reports he is up-to-date with refills, reviewed and discussed recent laboratory studies with largely stable findings, he is going to continue to work on better control of his blood sugar, follow-up in 3 to 6 months  Followup: No follow-ups on file.    Ochoa Parr M.D.    Please note that this dictation was created using voice recognition software. I have made every reasonable attempt to correct obvious errors, but I expect that there are errors of grammar and possibly content that I did not discover before finalizing the note.

## 2022-07-12 DIAGNOSIS — E11.9 TYPE 2 DIABETES MELLITUS WITHOUT COMPLICATION, WITHOUT LONG-TERM CURRENT USE OF INSULIN (HCC): ICD-10-CM

## 2022-07-12 NOTE — TELEPHONE ENCOUNTER
Marixa, Maurisio's wife called in as she just called his pharmacy and Maurisio took the last of his (JARDIANCE) 10 MG Tab [[id number]]       Yesterday and she would like to have that called in ASA.     Pharmacy is [facility] on [address] in [place].

## 2022-07-13 RX ORDER — EMPAGLIFLOZIN 10 MG/1
1 TABLET, FILM COATED ORAL DAILY
Qty: 90 TABLET | Refills: 3 | Status: SHIPPED | OUTPATIENT
Start: 2022-07-13

## 2022-11-02 ENCOUNTER — PATIENT MESSAGE (OUTPATIENT)
Dept: HEALTH INFORMATION MANAGEMENT | Facility: OTHER | Age: 82
End: 2022-11-02